# Patient Record
Sex: MALE
[De-identification: names, ages, dates, MRNs, and addresses within clinical notes are randomized per-mention and may not be internally consistent; named-entity substitution may affect disease eponyms.]

---

## 2017-05-01 ENCOUNTER — HOSPITAL (OUTPATIENT)
Dept: OTHER | Age: 67
End: 2017-05-01

## 2018-01-18 ENCOUNTER — HOSPITAL ENCOUNTER (OUTPATIENT)
Dept: GENERAL RADIOLOGY | Age: 68
Discharge: HOME | End: 2018-01-18
Attending: INTERNAL MEDICINE
Payer: MEDICARE

## 2018-01-18 DIAGNOSIS — R05.9 COUGH: ICD-10-CM

## 2018-01-18 PROCEDURE — 71046 X-RAY EXAM CHEST 2 VIEWS: CPT | Performed by: INTERNAL MEDICINE

## 2019-06-19 ENCOUNTER — TELEPHONE (OUTPATIENT)
Dept: PAIN CLINIC | Facility: HOSPITAL | Age: 69
End: 2019-06-19

## 2019-06-25 ENCOUNTER — OFFICE VISIT (OUTPATIENT)
Dept: PAIN CLINIC | Facility: HOSPITAL | Age: 69
End: 2019-06-25
Attending: ANESTHESIOLOGY
Payer: MEDICARE

## 2019-06-25 VITALS — HEIGHT: 72 IN | RESPIRATION RATE: 18 BRPM | BODY MASS INDEX: 38.74 KG/M2 | WEIGHT: 286 LBS

## 2019-06-25 DIAGNOSIS — G89.29 CHRONIC RIGHT-SIDED LOW BACK PAIN WITHOUT SCIATICA: Primary | ICD-10-CM

## 2019-06-25 DIAGNOSIS — M54.50 CHRONIC RIGHT-SIDED LOW BACK PAIN WITHOUT SCIATICA: Primary | ICD-10-CM

## 2019-06-25 PROCEDURE — 99201 HC OUTPT EVAL AND MGNT NEW PT LEVEL 1: CPT

## 2019-06-25 RX ORDER — SERTRALINE HYDROCHLORIDE 100 MG/1
TABLET, FILM COATED ORAL
Refills: 3 | COMMUNITY
Start: 2019-01-25

## 2019-06-25 RX ORDER — FINASTERIDE 5 MG/1
TABLET, FILM COATED ORAL
Refills: 1 | COMMUNITY
Start: 2019-03-12

## 2019-06-25 RX ORDER — CLOPIDOGREL BISULFATE 75 MG/1
TABLET ORAL
Refills: 3 | COMMUNITY
Start: 2019-01-25

## 2019-06-25 RX ORDER — CALCITONIN SALMON 200 [IU]/.09ML
SPRAY, METERED NASAL
Refills: 1 | COMMUNITY
Start: 2019-02-04

## 2019-06-25 RX ORDER — GLIMEPIRIDE 4 MG/1
TABLET ORAL
Refills: 0 | COMMUNITY
Start: 2019-03-01

## 2019-06-25 RX ORDER — ALPRAZOLAM 0.5 MG/1
TABLET ORAL
Refills: 2 | COMMUNITY
Start: 2019-05-15

## 2019-06-25 RX ORDER — GABAPENTIN 100 MG/1
200 CAPSULE ORAL NIGHTLY
Refills: 3 | COMMUNITY
Start: 2019-01-25

## 2019-06-25 RX ORDER — FLUTICASONE PROPIONATE 50 MCG
SPRAY, SUSPENSION (ML) NASAL
Refills: 2 | COMMUNITY
Start: 2019-01-27

## 2019-06-25 NOTE — CHRONIC PAIN
Initial Consultation Note    Chronic right-sided axial low back pain without radiculopathy    HISTORY OF PRESENT ILLNESS:  Robert Lima is a 29-year-old man referred by Dr. Amanda Swanson for evaluation of axial right-sided low back pain.   Pain began 2 to 3 years MG Oral Tab two tablet twice daily Disp: 360 Tab Rfl: 1   LESCOL 40 MG OR CAPS 1 CAPSULE DAILY Disp: 90 Cap Rfl: 0   LANTUS SOLOSTAR 100 UNIT/ML SC SOLN 50 untis qhs Disp: 60 mL Rfl: 1   HYZAAR 100-25 MG OR TABS 1 TABLET DAILY Disp: 90 Tab Rfl: 1   Wendy Tafton of nail     Unspecified essential hypertension     Type II or unspecified type diabetes mellitus without mention of complication, not stated as uncontrolled     COPD (chronic obstructive pulmonary disease) (HCC)     Mixed hyperlipidemia     KASSY (obstructiv Not on file        Emotionally abused: Not on file        Physically abused: Not on file        Forced sexual activity: Not on file    Other Topics      Concerns:        Not on file    Social History Narrative      Not on file    PHYSICAL EXAMINATION:   06 right-sided low back pain without radiculopathy  We discussed the findings with the patient and agreed on the following  1. No change in medications at this time  2. Obtain x-rays of his lumbosacral spine.   If necessary consider an MRI of his lumbosacral

## 2019-06-26 ENCOUNTER — HOSPITAL ENCOUNTER (OUTPATIENT)
Dept: GENERAL RADIOLOGY | Facility: HOSPITAL | Age: 69
Discharge: HOME | End: 2019-06-26
Attending: ANESTHESIOLOGY
Payer: MEDICARE

## 2019-06-26 DIAGNOSIS — M54.50 CHRONIC RIGHT-SIDED LOW BACK PAIN WITHOUT SCIATICA: ICD-10-CM

## 2019-06-26 DIAGNOSIS — G89.29 CHRONIC RIGHT-SIDED LOW BACK PAIN WITHOUT SCIATICA: ICD-10-CM

## 2019-06-26 PROCEDURE — 72110 X-RAY EXAM L-2 SPINE 4/>VWS: CPT | Performed by: ANESTHESIOLOGY

## 2019-07-11 ENCOUNTER — OFFICE VISIT (OUTPATIENT)
Dept: PAIN CLINIC | Facility: HOSPITAL | Age: 69
End: 2019-07-11
Attending: ANESTHESIOLOGY
Payer: MEDICARE

## 2019-07-11 VITALS
RESPIRATION RATE: 18 BRPM | HEART RATE: 81 BPM | BODY MASS INDEX: 38.74 KG/M2 | HEIGHT: 72 IN | DIASTOLIC BLOOD PRESSURE: 78 MMHG | SYSTOLIC BLOOD PRESSURE: 146 MMHG | WEIGHT: 286 LBS

## 2019-07-11 DIAGNOSIS — M50.30 DEGENERATION OF CERVICAL DISC WITHOUT MYELOPATHY: Primary | ICD-10-CM

## 2019-07-11 DIAGNOSIS — M54.9 CHRONIC RIGHT-SIDED BACK PAIN, UNSPECIFIED BACK LOCATION: ICD-10-CM

## 2019-07-11 DIAGNOSIS — G89.29 CHRONIC RIGHT-SIDED BACK PAIN, UNSPECIFIED BACK LOCATION: ICD-10-CM

## 2019-07-11 DIAGNOSIS — M79.10 MYALGIA: ICD-10-CM

## 2019-07-11 PROCEDURE — 99213 OFFICE O/P EST LOW 20 MIN: CPT

## 2019-07-11 PROCEDURE — 20552 NJX 1/MLT TRIGGER POINT 1/2: CPT

## 2019-07-11 RX ORDER — BUPIVACAINE HYDROCHLORIDE 2.5 MG/ML
10 INJECTION, SOLUTION EPIDURAL; INFILTRATION; INTRACAUDAL ONCE
Status: ACTIVE | OUTPATIENT
Start: 2019-07-11

## 2019-07-11 RX ORDER — METHYLPREDNISOLONE ACETATE 40 MG/ML
40 INJECTION, SUSPENSION INTRA-ARTICULAR; INTRALESIONAL; INTRAMUSCULAR; SOFT TISSUE ONCE
Status: ACTIVE | OUTPATIENT
Start: 2019-07-11

## 2019-07-11 NOTE — PROGRESS NOTES
06/25/19  INITIAL CONSULT:  PRESENTS AMBULATORY TO CPM;  NEW CONSULT C/O RLBP;  PT REPORTS AN INJURY 3 YRS AGO; \"TRYING TO GET UP FROM SITTING POSITION FELT A PULL\";  STATES THE PAIN HAS \"JUST GOTTEN WORSE OVER TIME;  SEEING DR. Zoraida Norman CHIROPRACTOR-\"HE

## 2019-07-11 NOTE — CHRONIC PAIN
[facility]  Pain Clinic  Follow Up Visit Report       Patient name: Jazmine Han 76year old male  : 1950  MRN: [id number]  Referring MD: Aishwarya Calhoun MD    COMPLAINT:  Patient presents with:   Follow - Up: Ashkan Rfl: 0  •  Sertraline HCl 100 MG Oral Tab, TK 2 TS PO QD, Disp: , Rfl: 3  •  Hydrocodone-Acetaminophen (NORCO)  MG Oral Tab, 1 CAPSULE EVERY 4 HOURS AS NEEDED, Disp: 60 Tab, Rfl: 1  •  ADVAIR DISKUS 250-50 MCG/DOSE Inhalation Aerosol Powder, Breath A depression   PHYSICAL EXAMINATION:  /78   Pulse 81   Resp 18   Ht 6' (1.829 m)   Wt 286 lb (129.7 kg)   BMI 38.79 kg/m²   General: Alert and oriented x3, NAD, appears stated age, appropriate disposition and demeanor, answers questions appropriately Herniorrhaphy mesh . Dictated by (CST):  Kelsey Nuñez MD on 6/26/2019 at 12:41           LABS:  Lab Results   Component Value Date    WBC 10.2 07/19/2011     07/19/2011       ASSESSMENT/PLAN:   Patient is a(n) 76year old year old male wi

## 2019-07-12 ENCOUNTER — DOCUMENTATION ONLY (OUTPATIENT)
Dept: PAIN CLINIC | Facility: HOSPITAL | Age: 69
End: 2019-07-12

## 2019-07-12 NOTE — PROGRESS NOTES
Procedure code 83917--[id number] APPROVAL     PA needed via [address]    All clinical notes submitted     Once approval has been received writer will reach out to the pt and schedule

## 2019-07-15 ENCOUNTER — TELEPHONE (OUTPATIENT)
Dept: PAIN CLINIC | Facility: HOSPITAL | Age: 69
End: 2019-07-15

## 2019-07-15 NOTE — TELEPHONE ENCOUNTER
Asked by Dr. Francisco J Rene to call patient regarding scheduling an appointment. Patient reports \"excellent\" pain relief following TPI done at office visit. The pain has now returned to baseline. Will have patient come in at 1030 on Wednesday 7/17/19.  Patient

## 2019-07-16 ENCOUNTER — DOCUMENTATION ONLY (OUTPATIENT)
Dept: PAIN CLINIC | Facility: HOSPITAL | Age: 69
End: 2019-07-16

## 2019-07-16 NOTE — PROGRESS NOTES
Procedure code 01634--APUWDRLO     Approval rcv'd from [facility] # [id number]    Spoke with pt and we have scheduled procedure for 07/29/19    Follow up appt also scheduled    Order form sent to the surgery center and confirmation rcv'd

## 2019-08-30 ENCOUNTER — OFFICE VISIT (OUTPATIENT)
Dept: PAIN CLINIC | Facility: HOSPITAL | Age: 69
End: 2019-08-30
Attending: NURSE PRACTITIONER
Payer: MEDICARE

## 2019-08-30 DIAGNOSIS — M79.10 MYALGIA: ICD-10-CM

## 2019-08-30 DIAGNOSIS — M50.30 DEGENERATION OF CERVICAL DISC WITHOUT MYELOPATHY: Primary | ICD-10-CM

## 2019-08-30 PROCEDURE — 99211 OFF/OP EST MAY X REQ PHY/QHP: CPT

## 2019-08-30 NOTE — CHRONIC PAIN
Follow-up Note  CC: low back pain   HISTORY OF PRESENT ILLNESS:  Makenzie Gaspar is a 76year old old male, originally referred to the pain clinic by Dr. Nation ref.  provider found, with history of Degeneration of cervical disc without myelopathy  (primary enco 50 untis qhs Disp: 60 mL Rfl: 1   HYZAAR 100-25 MG OR TABS 1 TABLET DAILY Disp: 90 Tab Rfl: 1   JANUVIA 100 MG OR TABS 1 TABLET DAILY Disp: 90 Tab Rfl: 1   TRICOR 145 MG OR TABS 1 TABLET DAILY Disp: 90 Tab Rfl: 1   ACCU-CHEK COMPACT TEST DRUM VI STRP check HYPERTRIGLYCERIDEMIA    • OBESITY    • SLEEP APNEA Psg 12-9-09    AHI 18 REM 88       SURGICAL HISTORY:  Past Surgical History:   Procedure Laterality Date   • COLONOSCOPY  2/07 (2017)    colonoscopy normal-    • HERNIA SURGERY      umb hernia repair   • K History Narrative      Not on file    ADVANCE CARE PLANNING:    The patient did not wish/unable to name a surrogate decision maker. PHYSICAL EXAMINATION:  There were no vitals filed for this visit.    General: Alert and oriented x3, NAD, appears stated age treatment options), face to face time, time spent reviewing data, obtaining patient information and discussing the care with the patients health care providers.

## 2019-09-17 ENCOUNTER — DOCUMENTATION ONLY (OUTPATIENT)
Dept: PAIN CLINIC | Facility: HOSPITAL | Age: 69
End: 2019-09-17

## 2019-09-26 ENCOUNTER — HOSPITAL ENCOUNTER (OUTPATIENT)
Dept: MRI IMAGING | Age: 69
Discharge: HOME | End: 2019-09-26
Attending: NURSE PRACTITIONER
Payer: MEDICARE

## 2019-09-26 DIAGNOSIS — M50.30 DEGENERATION OF CERVICAL DISC WITHOUT MYELOPATHY: ICD-10-CM

## 2019-09-26 PROCEDURE — 72148 MRI LUMBAR SPINE W/O DYE: CPT | Performed by: NURSE PRACTITIONER

## 2019-10-14 ENCOUNTER — OFFICE VISIT (OUTPATIENT)
Dept: PAIN CLINIC | Facility: HOSPITAL | Age: 69
End: 2019-10-14
Attending: NURSE PRACTITIONER
Payer: MEDICARE

## 2019-10-14 DIAGNOSIS — G89.29 CHRONIC RIGHT-SIDED LOW BACK PAIN WITHOUT SCIATICA: Primary | ICD-10-CM

## 2019-10-14 DIAGNOSIS — M54.50 CHRONIC RIGHT-SIDED LOW BACK PAIN WITHOUT SCIATICA: Primary | ICD-10-CM

## 2019-10-14 PROCEDURE — 99211 OFF/OP EST MAY X REQ PHY/QHP: CPT

## 2019-10-14 NOTE — PROGRESS NOTES
Patient presents to [facility] ambulatory for MRI results. He has chronic right lower back pain without rad. He takes norco prn \"but it doesn't help\". APN Green in to see patient. See provider notes.

## 2019-10-14 NOTE — CHRONIC PAIN
Follow-up Note  CC: right side low back pain   HISTORY OF PRESENT ILLNESS:  Ashley Berkowitz is a 76year old old male, originally referred to the pain clinic by Annabelle Price, with history of Chronic right-sided low back pain without sciatica  (primary encounte DAILY, Disp: 90 Cap, Rfl: 0  LANTUS SOLOSTAR 100 UNIT/ML SC SOLN, 50 untis qhs, Disp: 60 mL, Rfl: 1  HYZAAR 100-25 MG OR TABS, 1 TABLET DAILY, Disp: 90 Tab, Rfl: 1  JANUVIA 100 MG OR TABS, 1 TABLET DAILY, Disp: 90 Tab, Rfl: 1  TRICOR 145 MG OR TABS, 1 TABL Date   • DIABETES    • HYPERLIPIDEMIA    • HYPERTENSION 2005     stress test normal   • HYPERTRIGLYCERIDEMIA    • OBESITY    • SLEEP APNEA Psg 12-9-09    AHI 18 REM 88       SURGICAL HISTORY:  Past Surgical History:   Procedure Laterality Date   • Radha Nash activity: Not on file    Other Topics      Concerns:        Not on file    Social History Narrative      Not on file    ADVANCE CARE PLANNING:    The patient did not wish/unable to name a surrogate decision maker.   PHYSICAL EXAMINATION:  There were no adria the left L3 pedicle. CORD/CAUDA EQUINA:            Normal caliber, contour, and signal intensity. OTHER:             Negative.      LUMBAR DISC LEVELS:  L1-L2:   Mild hypertrophic changes of the facet joints and ligamentum flavum without significant c Hal Hastings MD on 9/26/2019 at 15:01       Approved by (CST): Hal Hastings MD on 9/26/2019 at 15:06       [address]  Yes      ASSESSMENT AND PLAN:    Yane Carballo is a 76year old  male, with  Right side low back pain.  MRI

## 2019-10-16 ENCOUNTER — DOCUMENTATION ONLY (OUTPATIENT)
Dept: PAIN CLINIC | Facility: HOSPITAL | Age: 69
End: 2019-10-16

## 2019-10-16 NOTE — PROGRESS NOTES
Procedure code 50109---NNAHKUO APPROVAL     PA started via [address] [place] [address]  clinical notes faxed to [facility] [address] to fax #     Once approval has been rcv'd writer will send order form over to the surgery center.

## 2019-10-17 ENCOUNTER — DOCUMENTATION ONLY (OUTPATIENT)
Dept: PAIN CLINIC | Facility: HOSPITAL | Age: 69
End: 2019-10-17

## 2019-10-17 NOTE — PROGRESS NOTES
Procedure code 07316--[id number]     Approval rcv'd via [facility] # [id number]    Valid from 10/16/19---11/30/19    Order form faxed to the surgery center, confirmation rcv'd

## 2019-11-24 NOTE — PROGRESS NOTES
Patient presents to [facility] ambulatory for follow up lesarabella. He repost only 20% pain relief since the injection. ASHLEY reen in to see patient. See provider notes. [Patient] : patient

## 2021-11-04 ENCOUNTER — APPOINTMENT (OUTPATIENT)
Dept: URBAN - METROPOLITAN AREA CLINIC 321 | Age: 71
Setting detail: DERMATOLOGY
End: 2021-11-04

## 2021-11-04 DIAGNOSIS — D22 MELANOCYTIC NEVI: ICD-10-CM

## 2021-11-04 DIAGNOSIS — L57.0 ACTINIC KERATOSIS: ICD-10-CM

## 2021-11-04 DIAGNOSIS — L81.4 OTHER MELANIN HYPERPIGMENTATION: ICD-10-CM

## 2021-11-04 DIAGNOSIS — L82.1 OTHER SEBORRHEIC KERATOSIS: ICD-10-CM

## 2021-11-04 PROBLEM — D48.5 NEOPLASM OF UNCERTAIN BEHAVIOR OF SKIN: Status: ACTIVE | Noted: 2021-11-04

## 2021-11-04 PROBLEM — D22.5 MELANOCYTIC NEVI OF TRUNK: Status: ACTIVE | Noted: 2021-11-04

## 2021-11-04 PROCEDURE — OTHER DEFER: OTHER

## 2021-11-04 PROCEDURE — OTHER COUNSELING: OTHER

## 2021-11-04 PROCEDURE — 99213 OFFICE O/P EST LOW 20 MIN: CPT

## 2021-11-04 ASSESSMENT — LOCATION SIMPLE DESCRIPTION DERM
LOCATION SIMPLE: ANTERIOR SCALP
LOCATION SIMPLE: CHEST
LOCATION SIMPLE: LEFT UPPER BACK
LOCATION SIMPLE: RIGHT UPPER BACK
LOCATION SIMPLE: LEFT UPPER ARM

## 2021-11-04 ASSESSMENT — LOCATION DETAILED DESCRIPTION DERM
LOCATION DETAILED: LEFT MEDIAL UPPER BACK
LOCATION DETAILED: RIGHT SUPERIOR MEDIAL UPPER BACK
LOCATION DETAILED: UPPER STERNUM
LOCATION DETAILED: MID-FRONTAL SCALP
LOCATION DETAILED: LEFT LATERAL PROXIMAL UPPER ARM

## 2021-11-04 ASSESSMENT — LOCATION ZONE DERM
LOCATION ZONE: TRUNK
LOCATION ZONE: ARM
LOCATION ZONE: SCALP

## 2021-11-04 NOTE — PROCEDURE: DEFER
Detail Level: Detailed
Introduction Text (Please End With A Colon): The following procedure was deferred: excision
Procedure To Be Performed At Next Visit: Liquid nitrogen
Procedure To Be Performed At Next Visit: Biopsy by shave method
Introduction Text (Please End With A Colon): The following procedure was deferred: bx.  would like to r/o melanocytic lesion
Introduction Text (Please End With A Colon): The following procedure was deferred: bx

## 2021-11-18 ENCOUNTER — APPOINTMENT (OUTPATIENT)
Dept: URBAN - METROPOLITAN AREA CLINIC 321 | Age: 71
Setting detail: DERMATOLOGY
End: 2021-11-18

## 2021-11-18 DIAGNOSIS — L82.1 OTHER SEBORRHEIC KERATOSIS: ICD-10-CM

## 2021-11-18 DIAGNOSIS — L57.0 ACTINIC KERATOSIS: ICD-10-CM

## 2021-11-18 DIAGNOSIS — L21.8 OTHER SEBORRHEIC DERMATITIS: ICD-10-CM

## 2021-11-18 PROBLEM — D48.5 NEOPLASM OF UNCERTAIN BEHAVIOR OF SKIN: Status: ACTIVE | Noted: 2021-11-18

## 2021-11-18 PROCEDURE — 11102 TANGNTL BX SKIN SINGLE LES: CPT

## 2021-11-18 PROCEDURE — OTHER PRESCRIPTION: OTHER

## 2021-11-18 PROCEDURE — OTHER COUNSELING: OTHER

## 2021-11-18 PROCEDURE — OTHER BIOPSY BY SHAVE METHOD: OTHER

## 2021-11-18 PROCEDURE — 99214 OFFICE O/P EST MOD 30 MIN: CPT | Mod: 25

## 2021-11-18 PROCEDURE — 11103 TANGNTL BX SKIN EA SEP/ADDL: CPT

## 2021-11-18 RX ORDER — TRIAMCINOLONE ACETONIDE 1 MG/G
OINTMENT TOPICAL
Qty: 30 | Refills: 0 | Status: CANCELLED | COMMUNITY
Start: 2021-11-18

## 2021-11-18 ASSESSMENT — LOCATION ZONE DERM
LOCATION ZONE: ARM
LOCATION ZONE: SCALP
LOCATION ZONE: SCALP

## 2021-11-18 ASSESSMENT — LOCATION DETAILED DESCRIPTION DERM
LOCATION DETAILED: RIGHT MEDIAL FRONTAL SCALP
LOCATION DETAILED: LEFT LATERAL PROXIMAL UPPER ARM
LOCATION DETAILED: RIGHT MEDIAL FRONTAL SCALP

## 2021-11-18 ASSESSMENT — LOCATION SIMPLE DESCRIPTION DERM
LOCATION SIMPLE: RIGHT SCALP
LOCATION SIMPLE: LEFT UPPER ARM
LOCATION SIMPLE: RIGHT SCALP

## 2021-11-18 NOTE — PROCEDURE: BIOPSY BY SHAVE METHOD
Validate That Anesthesia Is Not 0: No
Electrodesiccation And Curettage Text: The wound bed was treated with electrodesiccation and curettage after the biopsy was performed.
Detail Level: Detailed
Billing Type: Third-Party Bill
Was A Bandage Applied: Yes
Wound Care: Petrolatum
Notification Instructions: Patient will be notified of biopsy results. However, patient instructed to call the office if not contacted within 2 weeks.
Silver Nitrate Text: The wound bed was treated with silver nitrate after the biopsy was performed.
Depth Of Biopsy: dermis
Information: Selecting Yes will display possible errors in your note based on the variables you have selected. This validation is only offered as a suggestion for you. PLEASE NOTE THAT THE VALIDATION TEXT WILL BE REMOVED WHEN YOU FINALIZE YOUR NOTE. IF YOU WANT TO FAX A PRELIMINARY NOTE YOU WILL NEED TO TOGGLE THIS TO 'NO' IF YOU DO NOT WANT IT IN YOUR FAXED NOTE.
Dressing: bandage
X Size Of Lesion In Cm: 0
Anesthesia Volume In Cc (Will Not Render If 0): 1
Path Notes (To The Dermatopathologist): rule out basal cell carcinoma vs other NMSC vs other
Biopsy Method: Dermablade
Anesthesia Type: 1% lidocaine with epinephrine
Type Of Destruction Used: Curettage
Biopsy Type: H and E
Consent: Written consent was obtained and risks were reviewed including but not limited to scarring, infection, bleeding, scabbing, incomplete removal, nerve damage and allergy to anesthesia.
Cryotherapy Text: The wound bed was treated with cryotherapy after the biopsy was performed.
Electrodesiccation Text: The wound bed was treated with electrodesiccation after the biopsy was performed.
Hemostasis: Aluminum Chloride
Post-Care Instructions: I reviewed with the patient in detail post-care instructions. Patient is to keep the biopsy site dry overnight, and then apply vaseline once or twice daily until healed.
Curettage Text: The wound bed was treated with curettage after the biopsy was performed.

## 2021-11-20 ENCOUNTER — RX ONLY (RX ONLY)
Age: 71
End: 2021-11-20

## 2021-11-20 RX ORDER — TRIAMCINOLONE ACETONIDE 1 MG/G
OINTMENT TOPICAL
Qty: 30 | Refills: 0 | Status: ERX | COMMUNITY
Start: 2021-11-20

## 2021-12-16 ENCOUNTER — APPOINTMENT (OUTPATIENT)
Dept: URBAN - METROPOLITAN AREA CLINIC 321 | Age: 71
Setting detail: DERMATOLOGY
End: 2021-12-16

## 2021-12-16 DIAGNOSIS — L57.0 ACTINIC KERATOSIS: ICD-10-CM

## 2021-12-16 DIAGNOSIS — L82.0 INFLAMED SEBORRHEIC KERATOSIS: ICD-10-CM

## 2021-12-16 PROCEDURE — 17110 DESTRUCT B9 LESION 1-14: CPT

## 2021-12-16 PROCEDURE — 17000 DESTRUCT PREMALG LESION: CPT | Mod: 59

## 2021-12-16 PROCEDURE — OTHER LIQUID NITROGEN: OTHER

## 2021-12-16 ASSESSMENT — LOCATION DETAILED DESCRIPTION DERM
LOCATION DETAILED: RIGHT MEDIAL FRONTAL SCALP
LOCATION DETAILED: RIGHT MEDIAL FRONTAL SCALP
LOCATION DETAILED: RIGHT CENTRAL FRONTAL SCALP

## 2021-12-16 ASSESSMENT — LOCATION SIMPLE DESCRIPTION DERM
LOCATION SIMPLE: SCALP
LOCATION SIMPLE: RIGHT SCALP
LOCATION SIMPLE: RIGHT SCALP

## 2021-12-16 ASSESSMENT — LOCATION ZONE DERM
LOCATION ZONE: SCALP
LOCATION ZONE: SCALP

## 2021-12-16 NOTE — PROCEDURE: LIQUID NITROGEN
Render Post-Care Instructions In Note?: yes
Medical Necessity Information: It is in your best interest to select a reason for this procedure from the list below. All of these items fulfill various CMS LCD requirements except the new and changing color options.
Include Z78.9 (Other Specified Conditions Influencing Health Status) As An Associated Diagnosis?: No
Application Tool (Optional): Liquid Nitrogen Sprayer
Consent: The patient's consent was obtained including but not limited to risks of crusting, scabbing, blistering, scarring, darker or lighter pigmentary change, recurrence, incomplete removal / need for repeat treatments and infection.
Detail Level: Detailed
Duration Of Freeze Thaw-Cycle (Seconds): 5-10
Consent: The patient's consent was obtained including but not limited to risks of crusting, scabbing, blistering, scarring, darker or lighter pigmentary change, recurrence, incomplete removal/need for repeat treatments, and infection. Patient understands that treatment on feet/hands may limit daily activities if pain is significant after treatment and/or if blistering occurs.
Number Of Freeze-Thaw Cycles: 2 freeze-thaw cycles
Medical Necessity Clause: This procedure was medically necessary because the lesions that were treated were:
Post-Care Instructions: I reviewed with the patient in detail post-care instructions. Patient is to wear sunprotection, and avoid picking at any of the treated lesions. Pt may apply Vaseline to crusted or scabbing areas. If blistering occurs, pt understands to not pop blister and can cover with Vaseline + Band-Aid. If any topicals are prescribed (i.e wart peel or topical S.A), pt is to wait 5-7 days before applying to treated areas.
Post-Care Instructions: I reviewed with the patient in detail post-care instructions. Patient is to wear sunprotection, and avoid picking at any of the treated lesions. Pt may apply Vaseline to crusted or scabbing areas.
Duration Of Freeze Thaw-Cycle (Seconds): 10
Number Of Freeze-Thaw Cycles: 1 freeze-thaw cycle

## 2022-06-16 ENCOUNTER — APPOINTMENT (OUTPATIENT)
Dept: URBAN - METROPOLITAN AREA CLINIC 244 | Age: 72
Setting detail: DERMATOLOGY
End: 2022-06-16

## 2022-06-16 DIAGNOSIS — L57.0 ACTINIC KERATOSIS: ICD-10-CM

## 2022-06-16 DIAGNOSIS — L82.1 OTHER SEBORRHEIC KERATOSIS: ICD-10-CM

## 2022-06-16 DIAGNOSIS — L82.0 INFLAMED SEBORRHEIC KERATOSIS: ICD-10-CM

## 2022-06-16 DIAGNOSIS — D22 MELANOCYTIC NEVI: ICD-10-CM

## 2022-06-16 DIAGNOSIS — L72.0 EPIDERMAL CYST: ICD-10-CM

## 2022-06-16 DIAGNOSIS — L81.4 OTHER MELANIN HYPERPIGMENTATION: ICD-10-CM

## 2022-06-16 PROBLEM — D22.62 MELANOCYTIC NEVI OF LEFT UPPER LIMB, INCLUDING SHOULDER: Status: ACTIVE | Noted: 2022-06-16

## 2022-06-16 PROBLEM — D22.61 MELANOCYTIC NEVI OF RIGHT UPPER LIMB, INCLUDING SHOULDER: Status: ACTIVE | Noted: 2022-06-16

## 2022-06-16 PROBLEM — D22.5 MELANOCYTIC NEVI OF TRUNK: Status: ACTIVE | Noted: 2022-06-16

## 2022-06-16 PROCEDURE — 17003 DESTRUCT PREMALG LES 2-14: CPT | Mod: 59

## 2022-06-16 PROCEDURE — OTHER LIQUID NITROGEN: OTHER

## 2022-06-16 PROCEDURE — 17110 DESTRUCT B9 LESION 1-14: CPT

## 2022-06-16 PROCEDURE — 17000 DESTRUCT PREMALG LESION: CPT | Mod: 59

## 2022-06-16 PROCEDURE — 99213 OFFICE O/P EST LOW 20 MIN: CPT | Mod: 25

## 2022-06-16 PROCEDURE — OTHER COUNSELING: OTHER

## 2022-06-16 ASSESSMENT — LOCATION DETAILED DESCRIPTION DERM
LOCATION DETAILED: RIGHT MEDIAL SUPERIOR CHEST
LOCATION DETAILED: RIGHT LATERAL ABDOMEN
LOCATION DETAILED: MIDDLE STERNUM
LOCATION DETAILED: LEFT POSTERIOR EAR
LOCATION DETAILED: LEFT RIB CAGE
LOCATION DETAILED: UPPER STERNUM
LOCATION DETAILED: LEFT ANTECUBITAL SKIN
LOCATION DETAILED: RIGHT ANTERIOR DISTAL UPPER ARM
LOCATION DETAILED: LEFT ANTERIOR DISTAL UPPER ARM
LOCATION DETAILED: MID-FRONTAL SCALP
LOCATION DETAILED: RIGHT ANTECUBITAL SKIN
LOCATION DETAILED: EPIGASTRIC SKIN
LOCATION DETAILED: LEFT VENTRAL PROXIMAL FOREARM

## 2022-06-16 ASSESSMENT — LOCATION SIMPLE DESCRIPTION DERM
LOCATION SIMPLE: ABDOMEN
LOCATION SIMPLE: LEFT EAR
LOCATION SIMPLE: LEFT FOREARM
LOCATION SIMPLE: RIGHT UPPER ARM
LOCATION SIMPLE: CHEST
LOCATION SIMPLE: ANTERIOR SCALP
LOCATION SIMPLE: LEFT UPPER ARM

## 2022-06-16 ASSESSMENT — LOCATION ZONE DERM
LOCATION ZONE: SCALP
LOCATION ZONE: ARM
LOCATION ZONE: TRUNK
LOCATION ZONE: EAR

## 2022-06-16 NOTE — PROCEDURE: LIQUID NITROGEN
Medical Necessity Information: It is in your best interest to select a reason for this procedure from the list below. All of these items fulfill various CMS LCD requirements except the new and changing color options.
Detail Level: Zone
Include Z78.9 (Other Specified Conditions Influencing Health Status) As An Associated Diagnosis?: No
Post-Care Instructions: I reviewed with the patient in detail post-care instructions. Patient is to wear sunprotection, and avoid picking at any of the treated lesions. Pt may apply Vaseline to crusted or scabbing areas.
Consent: The patient's consent was obtained including but not limited to risks of crusting, scabbing, blistering, scarring, darker or lighter pigmentary change, recurrence, incomplete removal and infection.
Duration Of Freeze Thaw-Cycle (Seconds): 5-10
Show Topical Anesthesia Variable?: Yes
Medical Necessity Clause: This procedure was medically necessary because the lesions that were treated were:
Duration Of Freeze Thaw-Cycle (Seconds): 0
Total Number Of Aks Treated: 4
Number Of Freeze-Thaw Cycles: 1 freeze-thaw cycle
Detail Level: Detailed

## 2022-07-08 NOTE — PROGRESS NOTES
06/25/19  INITIAL CONSULT:  PRESENTS AMBULATORY TO CPM;  NEW CONSULT C/O RLBP;  PT REPORTS AN INJURY 3 YRS AGO; \"TRYING TO GET UP FROM SITTING POSITION FELT A PULL\";  STATES THE PAIN HAS \"JUST GOTTEN WORSE OVER TIME;  SEEING DR. Norma Carballo CHIROPRACTOR-\"HE
independent

## 2022-08-04 ENCOUNTER — APPOINTMENT (OUTPATIENT)
Dept: URBAN - METROPOLITAN AREA CLINIC 244 | Age: 72
Setting detail: DERMATOLOGY
End: 2022-08-05

## 2022-08-04 DIAGNOSIS — L72.0 EPIDERMAL CYST: ICD-10-CM

## 2022-08-04 PROCEDURE — OTHER COUNSELING: OTHER

## 2022-08-04 PROCEDURE — OTHER PRESCRIPTION: OTHER

## 2022-08-04 PROCEDURE — OTHER DEFER: OTHER

## 2022-08-04 PROCEDURE — 99213 OFFICE O/P EST LOW 20 MIN: CPT

## 2022-08-04 RX ORDER — DOXYCYCLINE 100 MG/1
CAPSULE ORAL
Qty: 60 | Refills: 0 | Status: ERX | COMMUNITY
Start: 2022-08-04

## 2022-08-04 ASSESSMENT — LOCATION SIMPLE DESCRIPTION DERM: LOCATION SIMPLE: RIGHT BUTTOCK

## 2022-08-04 ASSESSMENT — LOCATION DETAILED DESCRIPTION DERM: LOCATION DETAILED: RIGHT BUTTOCK

## 2022-08-04 ASSESSMENT — LOCATION ZONE DERM: LOCATION ZONE: TRUNK

## 2022-08-04 NOTE — PROCEDURE: DEFER
Introduction Text (Please End With A Colon): The following procedure was deferred:
Procedure To Be Performed At Next Visit: Excision
Size Of Lesion In Cm (Optional): 0
Detail Level: Detailed
Instructions (Optional): R thigh below buttock

## 2023-06-15 ENCOUNTER — APPOINTMENT (OUTPATIENT)
Dept: URBAN - METROPOLITAN AREA CLINIC 244 | Age: 73
Setting detail: DERMATOLOGY
End: 2023-06-15

## 2023-06-15 DIAGNOSIS — D22 MELANOCYTIC NEVI: ICD-10-CM

## 2023-06-15 DIAGNOSIS — L82.1 OTHER SEBORRHEIC KERATOSIS: ICD-10-CM

## 2023-06-15 DIAGNOSIS — L81.4 OTHER MELANIN HYPERPIGMENTATION: ICD-10-CM

## 2023-06-15 DIAGNOSIS — L72.8 OTHER FOLLICULAR CYSTS OF THE SKIN AND SUBCUTANEOUS TISSUE: ICD-10-CM

## 2023-06-15 DIAGNOSIS — L57.0 ACTINIC KERATOSIS: ICD-10-CM

## 2023-06-15 PROBLEM — D22.61 MELANOCYTIC NEVI OF RIGHT UPPER LIMB, INCLUDING SHOULDER: Status: ACTIVE | Noted: 2023-06-15

## 2023-06-15 PROBLEM — D22.5 MELANOCYTIC NEVI OF TRUNK: Status: ACTIVE | Noted: 2023-06-15

## 2023-06-15 PROBLEM — D48.5 NEOPLASM OF UNCERTAIN BEHAVIOR OF SKIN: Status: ACTIVE | Noted: 2023-06-15

## 2023-06-15 PROBLEM — D22.62 MELANOCYTIC NEVI OF LEFT UPPER LIMB, INCLUDING SHOULDER: Status: ACTIVE | Noted: 2023-06-15

## 2023-06-15 PROCEDURE — OTHER BIOPSY BY SHAVE METHOD: OTHER

## 2023-06-15 PROCEDURE — 99213 OFFICE O/P EST LOW 20 MIN: CPT | Mod: 25

## 2023-06-15 PROCEDURE — OTHER LIQUID NITROGEN: OTHER

## 2023-06-15 PROCEDURE — 11102 TANGNTL BX SKIN SINGLE LES: CPT

## 2023-06-15 PROCEDURE — 17000 DESTRUCT PREMALG LESION: CPT | Mod: 59

## 2023-06-15 PROCEDURE — OTHER COUNSELING: OTHER

## 2023-06-15 PROCEDURE — 17003 DESTRUCT PREMALG LES 2-14: CPT

## 2023-06-15 ASSESSMENT — LOCATION SIMPLE DESCRIPTION DERM
LOCATION SIMPLE: LEFT UPPER ARM
LOCATION SIMPLE: LEFT EAR
LOCATION SIMPLE: SCROTUM
LOCATION SIMPLE: CHEST
LOCATION SIMPLE: RIGHT LOWER BACK
LOCATION SIMPLE: RIGHT UPPER ARM
LOCATION SIMPLE: ANTERIOR SCALP
LOCATION SIMPLE: LEFT FOREARM

## 2023-06-15 ASSESSMENT — LOCATION DETAILED DESCRIPTION DERM
LOCATION DETAILED: MID-FRONTAL SCALP
LOCATION DETAILED: RIGHT ANTERIOR DISTAL UPPER ARM
LOCATION DETAILED: MIDDLE STERNUM
LOCATION DETAILED: LEFT ANTERIOR DISTAL UPPER ARM
LOCATION DETAILED: LEFT VENTRAL PROXIMAL FOREARM
LOCATION DETAILED: LEFT ANTECUBITAL SKIN
LOCATION DETAILED: RIGHT ANTECUBITAL SKIN
LOCATION DETAILED: RIGHT MEDIAL SUPERIOR CHEST
LOCATION DETAILED: RIGHT SUPERIOR LATERAL LOWER BACK
LOCATION DETAILED: UPPER STERNUM
LOCATION DETAILED: LEFT ANTERIOR SCROTUM
LOCATION DETAILED: LEFT SUPERIOR HELIX

## 2023-06-15 ASSESSMENT — LOCATION ZONE DERM
LOCATION ZONE: TRUNK
LOCATION ZONE: EAR
LOCATION ZONE: GENITALIA
LOCATION ZONE: SCALP
LOCATION ZONE: ARM

## 2023-06-15 NOTE — PROCEDURE: BIOPSY BY SHAVE METHOD
POSTERIOR VITREOUS DETACHMENT, OU: NO HOLES. NO TEARS. RETURN FOR FOLLOW-UP AS SCHEDULED.
Validate Lesion Size: No
Detail Level: Detailed
Type Of Destruction Used: Curettage
Anesthesia Type: 0.5% lidocaine with 1:200,000 epinephrine and a 1:10 solution of 8.4% sodium bicarbonate
Notification Instructions: Patient will be notified of biopsy results. However, patient instructed to call the office if not contacted within 2 weeks.
Depth Of Biopsy: dermis
Silver Nitrate Text: The wound bed was treated with silver nitrate after the biopsy was performed.
Consent: Written consent was obtained and risks were reviewed including but not limited to scarring, infection, bleeding, scabbing, incomplete removal, nerve damage and allergy to anesthesia.
Cryotherapy Text: The wound bed was treated with cryotherapy after the biopsy was performed.
Biopsy Type: H and E
Information: Selecting Yes will display possible errors in your note based on the variables you have selected. This validation is only offered as a suggestion for you. PLEASE NOTE THAT THE VALIDATION TEXT WILL BE REMOVED WHEN YOU FINALIZE YOUR NOTE. IF YOU WANT TO FAX A PRELIMINARY NOTE YOU WILL NEED TO TOGGLE THIS TO 'NO' IF YOU DO NOT WANT IT IN YOUR FAXED NOTE.
Biopsy Method: Dermablade
Billing Type: Third-Party Bill
Electrodesiccation Text: The wound bed was treated with electrodesiccation after the biopsy was performed.
Size Of Lesion In Cm: 0
Dressing: bandage
Anesthesia Volume In Cc (Will Not Render If 0): 0.5
Post-Care Instructions: I reviewed with the patient in detail post-care instructions. Patient is to keep the biopsy site dry overnight, and then apply Petrolatum twice daily until healed, or after a night or two leave area open and dry overnight and a scab will form which will fall off on its own in a few weeks.
Electrodesiccation And Curettage Text: The wound bed was treated with electrodesiccation and curettage after the biopsy was performed.
Was A Bandage Applied: Yes
Hemostasis: Drysol
Curettage Text: The wound bed was treated with curettage after the biopsy was performed.
Wound Care: Petrolatum

## 2023-06-15 NOTE — PROCEDURE: LIQUID NITROGEN
Total Number Of Aks Treated: 9
Consent: The patient's consent was obtained including but not limited to risks of crusting, scabbing, blistering, scarring, darker or lighter pigmentary change, recurrence, incomplete removal and infection.
Detail Level: Zone
Duration Of Freeze Thaw-Cycle (Seconds): 0
Render In Bullet Format When Appropriate: No
Post-Care Instructions: I reviewed with the patient in detail post-care instructions. Patient is to wear sunprotection, and avoid picking at any of the treated lesions. Pt may apply Vaseline to crusted or scabbing areas.

## 2024-06-26 ENCOUNTER — APPOINTMENT (OUTPATIENT)
Dept: URBAN - METROPOLITAN AREA CLINIC 244 | Age: 74
Setting detail: DERMATOLOGY
End: 2024-06-26

## 2024-06-26 DIAGNOSIS — L81.4 OTHER MELANIN HYPERPIGMENTATION: ICD-10-CM

## 2024-06-26 DIAGNOSIS — L57.0 ACTINIC KERATOSIS: ICD-10-CM

## 2024-06-26 DIAGNOSIS — L72.8 OTHER FOLLICULAR CYSTS OF THE SKIN AND SUBCUTANEOUS TISSUE: ICD-10-CM

## 2024-06-26 DIAGNOSIS — L82.1 OTHER SEBORRHEIC KERATOSIS: ICD-10-CM

## 2024-06-26 DIAGNOSIS — D22 MELANOCYTIC NEVI: ICD-10-CM

## 2024-06-26 PROBLEM — D22.9 MELANOCYTIC NEVI, UNSPECIFIED: Status: ACTIVE | Noted: 2024-06-26

## 2024-06-26 PROCEDURE — 17003 DESTRUCT PREMALG LES 2-14: CPT

## 2024-06-26 PROCEDURE — 17000 DESTRUCT PREMALG LESION: CPT

## 2024-06-26 PROCEDURE — 99213 OFFICE O/P EST LOW 20 MIN: CPT | Mod: 25

## 2024-06-26 PROCEDURE — OTHER LIQUID NITROGEN: OTHER

## 2024-06-26 PROCEDURE — OTHER COUNSELING: OTHER

## 2024-06-26 ASSESSMENT — LOCATION DETAILED DESCRIPTION DERM
LOCATION DETAILED: RIGHT SUPERIOR PARIETAL SCALP
LOCATION DETAILED: LEFT ANTERIOR SCROTUM
LOCATION DETAILED: RIGHT CENTRAL FRONTAL SCALP
LOCATION DETAILED: LEFT MEDIAL FRONTAL SCALP
LOCATION DETAILED: LEFT CENTRAL FRONTAL SCALP
LOCATION DETAILED: LEFT DISTAL DORSAL FOREARM

## 2024-06-26 ASSESSMENT — LOCATION SIMPLE DESCRIPTION DERM
LOCATION SIMPLE: RIGHT SCALP
LOCATION SIMPLE: LEFT SCALP
LOCATION SIMPLE: SCALP
LOCATION SIMPLE: SCROTUM
LOCATION SIMPLE: LEFT FOREARM

## 2024-06-26 ASSESSMENT — LOCATION ZONE DERM
LOCATION ZONE: GENITALIA
LOCATION ZONE: ARM
LOCATION ZONE: SCALP

## 2024-06-26 NOTE — PROCEDURE: COUNSELING
Detail Level: Generalized
Detail Level: Simple
Detail Level: Detailed
Nicotinamide Supplementation Recommendations: All supplements should be USP (https://www.usp.org/verification-services/verified-jesse).
Sunscreen Recommendations: Brands include neutrogena, La-Roche, and Elta-MD. Rec mineral sunscreen use (zinc/titanium dioxide) over chemical sunscreens due to safety.